# Patient Record
Sex: MALE | Race: WHITE | NOT HISPANIC OR LATINO | Employment: OTHER | URBAN - METROPOLITAN AREA
[De-identification: names, ages, dates, MRNs, and addresses within clinical notes are randomized per-mention and may not be internally consistent; named-entity substitution may affect disease eponyms.]

---

## 2019-08-12 ENCOUNTER — HOSPITAL ENCOUNTER (OUTPATIENT)
Dept: URGENT CARE | Facility: CLINIC | Age: 39
Discharge: HOME | End: 2019-08-12

## 2021-08-19 ENCOUNTER — TRANSCRIBE ORDERS (OUTPATIENT)
Dept: ADMINISTRATIVE | Facility: HOSPITAL | Age: 41
End: 2021-08-19

## 2021-08-19 ENCOUNTER — HOSPITAL ENCOUNTER (OUTPATIENT)
Dept: CT IMAGING | Facility: HOSPITAL | Age: 41
Discharge: HOME | End: 2021-08-19
Admitting: PHYSICIAN ASSISTANT

## 2021-08-19 DIAGNOSIS — R10.9 ABDOMINAL PAIN, UNSPECIFIED ABDOMINAL LOCATION: Primary | ICD-10-CM

## 2021-08-19 DIAGNOSIS — R10.9 ABDOMINAL PAIN, UNSPECIFIED ABDOMINAL LOCATION: ICD-10-CM

## 2021-08-19 PROCEDURE — 0 IOPAMIDOL PER 1 ML: Performed by: PHYSICIAN ASSISTANT

## 2021-08-19 PROCEDURE — 74177 CT ABD & PELVIS W/CONTRAST: CPT

## 2021-08-19 RX ADMIN — IOPAMIDOL 100 ML: 755 INJECTION, SOLUTION INTRAVENOUS at 13:35

## 2021-09-03 ENCOUNTER — TRANSCRIBE ORDERS (OUTPATIENT)
Dept: ADMINISTRATIVE | Facility: HOSPITAL | Age: 41
End: 2021-09-03

## 2021-09-03 DIAGNOSIS — R19.02 ABDOMINAL MASS, LUQ (LEFT UPPER QUADRANT): Primary | ICD-10-CM

## 2021-09-22 ENCOUNTER — APPOINTMENT (OUTPATIENT)
Dept: ULTRASOUND IMAGING | Facility: HOSPITAL | Age: 41
End: 2021-09-22

## 2022-02-01 ENCOUNTER — TRANSCRIBE ORDERS (OUTPATIENT)
Dept: ADMINISTRATIVE | Facility: HOSPITAL | Age: 42
End: 2022-02-01

## 2022-02-01 DIAGNOSIS — R19.00 ABDOMINAL MASS, UNSPECIFIED ABDOMINAL LOCATION: Primary | ICD-10-CM

## 2022-03-01 ENCOUNTER — APPOINTMENT (OUTPATIENT)
Dept: ULTRASOUND IMAGING | Facility: HOSPITAL | Age: 42
End: 2022-03-01

## 2023-10-31 ENCOUNTER — OFFICE VISIT (OUTPATIENT)
Dept: FAMILY MEDICINE CLINIC | Facility: CLINIC | Age: 43
End: 2023-10-31
Payer: COMMERCIAL

## 2023-10-31 VITALS
WEIGHT: 224.8 LBS | SYSTOLIC BLOOD PRESSURE: 130 MMHG | RESPIRATION RATE: 18 BRPM | DIASTOLIC BLOOD PRESSURE: 74 MMHG | TEMPERATURE: 97.8 F | BODY MASS INDEX: 32.18 KG/M2 | OXYGEN SATURATION: 98 % | HEIGHT: 70 IN | HEART RATE: 74 BPM

## 2023-10-31 DIAGNOSIS — Z13.220 SCREENING FOR LIPID DISORDERS: ICD-10-CM

## 2023-10-31 DIAGNOSIS — Z00.00 ANNUAL PHYSICAL EXAM: Primary | ICD-10-CM

## 2023-10-31 DIAGNOSIS — K21.9 GASTROESOPHAGEAL REFLUX DISEASE WITHOUT ESOPHAGITIS: ICD-10-CM

## 2023-10-31 DIAGNOSIS — E66.09 CLASS 1 OBESITY DUE TO EXCESS CALORIES WITH SERIOUS COMORBIDITY AND BODY MASS INDEX (BMI) OF 32.0 TO 32.9 IN ADULT: ICD-10-CM

## 2023-10-31 DIAGNOSIS — Z12.5 SCREENING FOR PROSTATE CANCER: ICD-10-CM

## 2023-10-31 DIAGNOSIS — Z11.59 NEED FOR HEPATITIS C SCREENING TEST: ICD-10-CM

## 2023-10-31 PROBLEM — E66.811 CLASS 1 OBESITY DUE TO EXCESS CALORIES WITH SERIOUS COMORBIDITY AND BODY MASS INDEX (BMI) OF 32.0 TO 32.9 IN ADULT: Chronic | Status: ACTIVE | Noted: 2023-10-31

## 2023-10-31 PROBLEM — F17.200 NICOTINE DEPENDENCE: Status: ACTIVE | Noted: 2023-10-31

## 2023-10-31 PROBLEM — E66.811 CLASS 1 OBESITY DUE TO EXCESS CALORIES WITH SERIOUS COMORBIDITY AND BODY MASS INDEX (BMI) OF 32.0 TO 32.9 IN ADULT: Status: ACTIVE | Noted: 2023-10-31

## 2023-10-31 RX ORDER — OMEPRAZOLE 20 MG/1
20 CAPSULE, DELAYED RELEASE ORAL
COMMUNITY

## 2023-10-31 NOTE — ASSESSMENT & PLAN NOTE
The patient was encouraged to always wear their seatbelt and never text and drive.  They were encouraged to get 7 to 8 hours sleep at night.  They were encouraged to exercise on a regular basis.  Screening labs were reviewed at today's visit and manage according to findings.

## 2023-10-31 NOTE — PROGRESS NOTES
"Chief Complaint  Establish Care and Annual Exam    Subjective        Jaya Jay presents to [facility]  History of Present Illness  Jaya presents to the clinic as a new patient to establish care. He has no significant past medical or surgical history. He is a current smoker 1.5 ppd for about 25 years, and drinks alcohol occasionally. He is allergic to bee venom and and takes no medication regularly, he just takes omeprazole 20 mg prn. 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 He lives at home with his wife, they have two cats, pepper and nugget.       Objective   Vital Signs:  /74 (BP Location: Left arm, Patient Position: Sitting, Cuff Size: Adult)   Pulse 74   Temp 97.8 °F (36.6 °C) (Tympanic)   Resp 18   Ht 177.8 cm (70\")   Wt 102 kg (224 lb 12.8 oz)   SpO2 98%   BMI 32.26 kg/m²   Estimated body mass index is 32.26 kg/m² as calculated from the following:    Height as of this encounter: 177.8 cm (70\").    Weight as of this encounter: 102 kg (224 lb 12.8 oz).             Physical Exam  Vitals reviewed.   Constitutional:       General: He is awake.      Appearance: Normal appearance. He is obese.   HENT:      Head: Normocephalic.      Nose: Nose normal.   Cardiovascular:      Rate and Rhythm: Normal rate and regular rhythm.   Pulmonary:      Effort: Pulmonary effort is normal.      Breath sounds: Normal breath sounds.   Abdominal:      General: Bowel sounds are normal.      Palpations: Abdomen is soft.   Musculoskeletal:         General: Normal range of motion.      Cervical back: Normal range of motion.   Skin:     General: Skin is warm and dry.   Neurological:      General: No focal deficit present.      Mental Status: He is alert and oriented to person, place, and time.   Psychiatric:         Attention and Perception: Attention normal.         Mood and Affect: Mood normal.         Speech: Speech normal.         " Behavior: Behavior normal. Behavior is cooperative.        Result Review :                         Assessment and Plan   Diagnoses and all orders for this visit:    1. Annual physical exam (Primary)  Assessment & Plan:  The patient was encouraged to always wear their seatbelt and never text and drive.  They were encouraged to get 7 to 8 hours sleep at night.  They were encouraged to exercise on a regular basis.  Screening labs were reviewed at today's visit and manage according to findings.        Orders:  -     TSH; Future  -     Comprehensive Metabolic Panel; Future  -     CBC & Differential; Future    2. Screening for lipid disorders  -     Lipid Panel; Future    3. Screening for prostate cancer  -     PSA Screen; Future    4. Need for hepatitis C screening test  -     Hepatitis C Antibody; Future    5. Class 1 obesity due to excess calories with serious comorbidity and body mass index (BMI) of 32.0 to 32.9 in adult  Assessment & Plan:  Patient's (Body mass index is 32.26 kg/m².) indicates that they are obese (BMI >30) with health conditions that include none . Weight is newly diagnosed. BMI  is above average; BMI management plan is completed. We discussed low calorie, low carb based diet program, portion control, and increasing exercise.       6. Gastroesophageal reflux disease without esophagitis  Assessment & Plan:  The patient will continue taking omeprazole 20 mg as needed for reflux symptoms.               Follow Up   Return in about 1 year (around 10/31/2024).  Patient was given instructions and counseling regarding his condition or for health maintenance advice. Please see specific information pulled into the AVS if appropriate.

## 2023-10-31 NOTE — ASSESSMENT & PLAN NOTE
Patient's (Body mass index is 32.26 kg/m².) indicates that they are obese (BMI >30) with health conditions that include none . Weight is newly diagnosed. BMI  is above average; BMI management plan is completed. We discussed low calorie, low carb based diet program, portion control, and increasing exercise.

## 2024-07-02 ENCOUNTER — HOSPITAL ENCOUNTER (OUTPATIENT)
Dept: CT IMAGING | Facility: HOSPITAL | Age: 44
Discharge: HOME | End: 2024-07-02
Payer: COMMERCIAL

## 2024-07-02 ENCOUNTER — OFFICE VISIT (OUTPATIENT)
Dept: FAMILY MEDICINE CLINIC | Facility: CLINIC | Age: 44
End: 2024-07-02
Payer: COMMERCIAL

## 2024-07-02 VITALS
HEIGHT: 70 IN | WEIGHT: 209 LBS | BODY MASS INDEX: 29.92 KG/M2 | SYSTOLIC BLOOD PRESSURE: 132 MMHG | TEMPERATURE: 98.7 F | HEART RATE: 66 BPM | DIASTOLIC BLOOD PRESSURE: 77 MMHG | OXYGEN SATURATION: 99 %

## 2024-07-02 DIAGNOSIS — E66.09 CLASS 1 OBESITY DUE TO EXCESS CALORIES WITH SERIOUS COMORBIDITY AND BODY MASS INDEX (BMI) OF 32.0 TO 32.9 IN ADULT: Chronic | ICD-10-CM

## 2024-07-02 DIAGNOSIS — R10.12 ABDOMINAL PAIN, LUQ: Primary | ICD-10-CM

## 2024-07-02 DIAGNOSIS — R10.12 ABDOMINAL PAIN, LUQ: ICD-10-CM

## 2024-07-02 PROCEDURE — 74177 CT ABD & PELVIS W/CONTRAST: CPT

## 2024-07-02 PROCEDURE — 25510000001 IOPAMIDOL PER 1 ML

## 2024-07-02 PROCEDURE — 99213 OFFICE O/P EST LOW 20 MIN: CPT

## 2024-07-02 RX ADMIN — IOPAMIDOL 100 ML: 755 INJECTION, SOLUTION INTRAVENOUS at 15:42

## 2024-07-02 NOTE — PROGRESS NOTES
"Chief Complaint  Flank Pain (Since Sunday night, left side nothing makes it better, constant sharp pain and breathing and riding on the  made it worse )    Subjective        Jaya Jay presents to [facility]  History of Present Illness  Jaya is here to be seen for flank pain since Sunday night on the left side. It is a constant sharp pain and riding the  and breathing make it worse. It started out in the left upper quadrant and has begun radiating to the pain.       Objective   Vital Signs:  /77 (BP Location: Right arm, Patient Position: Sitting, Cuff Size: Adult)   Pulse 66   Temp 98.7 °F (37.1 °C)   Ht 177.8 cm (70\")   Wt 94.8 kg (209 lb)   SpO2 99%   BMI 29.99 kg/m²   Estimated body mass index is 29.99 kg/m² as calculated from the following:    Height as of this encounter: 177.8 cm (70\").    Weight as of this encounter: 94.8 kg (209 lb).               Physical Exam  Constitutional:       Appearance: Normal appearance.   HENT:      Nose: Nose normal.      Mouth/Throat:      Mouth: Mucous membranes are moist.   Cardiovascular:      Rate and Rhythm: Normal rate and regular rhythm.      Pulses: Normal pulses.      Heart sounds: Normal heart sounds.   Pulmonary:      Effort: Pulmonary effort is normal.      Breath sounds: Normal breath sounds.   Abdominal:      Tenderness: There is abdominal tenderness (LUQ).   Skin:     General: Skin is warm and dry.   Neurological:      General: No focal deficit present.      Mental Status: He is alert and oriented to person, place, and time.   Psychiatric:         Mood and Affect: Mood normal.         Behavior: Behavior normal.        Result Review :                     Assessment and Plan     Diagnoses and all orders for this visit:    1. Abdominal pain, LUQ (Primary)  -     CT Abdomen Pelvis Without Contrast; Future  -     Cancel: CT Abdomen Pelvis With Contrast; Future  -     CT Abdomen Pelvis With Contrast; " Future    2. Class 1 obesity due to excess calories with serious comorbidity and body mass index (BMI) of 32.0 to 32.9 in adult  Comments:  BMI now down to 29.99.             Follow Up     Return if symptoms worsen or fail to improve.  Patient was given instructions and counseling regarding his condition or for health maintenance advice. Please see specific information pulled into the AVS if appropriate.

## 2024-07-02 NOTE — PROGRESS NOTES
Nothing acute seen. There is some cholelithiasis but not cholecystitis.  Miles Lemos), Internal Medicine  [address] [place] [address]  Phone: [phone]  Fax: [phone]    Blaine Persaud), Neurology  [address] [place] [address]  Phone: [phone]  Fax: [phone]

## 2024-12-03 ENCOUNTER — OFFICE VISIT (OUTPATIENT)
Dept: GASTROENTEROLOGY | Facility: CLINIC | Age: 44
End: 2024-12-03
Payer: COMMERCIAL

## 2024-12-03 VITALS
HEIGHT: 70 IN | DIASTOLIC BLOOD PRESSURE: 80 MMHG | HEART RATE: 72 BPM | BODY MASS INDEX: 30.92 KG/M2 | SYSTOLIC BLOOD PRESSURE: 145 MMHG | WEIGHT: 216 LBS

## 2024-12-03 DIAGNOSIS — R10.30 LOWER ABDOMINAL PAIN: ICD-10-CM

## 2024-12-03 DIAGNOSIS — R10.13 EPIGASTRIC PAIN: Primary | ICD-10-CM

## 2024-12-03 DIAGNOSIS — R13.19 ESOPHAGEAL DYSPHAGIA: ICD-10-CM

## 2024-12-03 PROCEDURE — 99214 OFFICE O/P EST MOD 30 MIN: CPT | Performed by: NURSE PRACTITIONER

## 2024-12-03 RX ORDER — SODIUM, POTASSIUM,MAG SULFATES 17.5-3.13G
2 SOLUTION, RECONSTITUTED, ORAL ORAL TAKE AS DIRECTED
Qty: 354 ML | Refills: 0 | Status: SHIPPED | OUTPATIENT
Start: 2024-12-03

## 2024-12-03 RX ORDER — PANTOPRAZOLE SODIUM 40 MG/1
40 TABLET, DELAYED RELEASE ORAL DAILY
Qty: 90 TABLET | Refills: 1 | Status: SHIPPED | OUTPATIENT
Start: 2024-12-03

## 2024-12-03 NOTE — PROGRESS NOTES
Chief Complaint     Abdominal Pain, Diarrhea, Weight Loss, and Chest Pain    History of Present Illness     Jaya Jay is a 44 y.o. male who presents to [facility] on referral from Karol Escobar DO for a gastroenterology evaluation of abdominal pain, diarrhea and weight loss.      Reports experiencing lower abdominal pain that began 2 to 3 months ago.  He states that pain was in the lower portion of the abdomen and waxes and wanes.  This has improved recently.  States that bowel pattern is regular.  He has 1-2 bowel movements daily.  Denies rectal bleeding.    Admits epigastric pain that began on Saturday evening.  When pain was present that radiated to both sides of the upper abdomen.  He did note that pain was worse following a meal.  He experienced 2-3 episodes of vomiting on Sunday and a few episodes of diarrhea.  Yesterday he avoided eating throughout the day.  He ate chicken noodle soup last evening and did experience some epigastric pain following this.    Reports that he's had heartburn for many years that he describes as sporadic.  He has lost some weight this year and feels that heartburn has been improved since losing weight.  Admits periodic dysphagia however he has noticed this to be worsening in the past few weeks.       History      History reviewed. No pertinent past medical history.    Past Surgical History:   Procedure Laterality Date    FRACTURE SURGERY      arm       History reviewed. No pertinent family history.     Current Medications        Current Outpatient Medications:     pantoprazole (Protonix) 40 MG EC tablet, Take 1 tablet by mouth Daily., Disp: 90 tablet, Rfl: 1    sodium-potassium-magnesium sulfates (Suprep Bowel Prep Kit) 17.5-3.13-1.6 GM/177ML solution oral solution, Take 2 bottles by mouth Take As Directed., Disp: 354 mL, Rfl: 0     Allergies     Allergies   Allergen Reactions    Bee Venom Swelling       Social History       Social History  "    Social History Narrative    Not on file       Immunizations     Immunization:  Immunization History   Administered Date(s) Administered    Td (TDVAX) 12/04/1996          Objective     Objective     Vital Signs:   /80 (BP Location: Left arm, Patient Position: Sitting, Cuff Size: Adult)   Pulse 72   Ht 177.8 cm (70\")   Wt 98 kg (216 lb)   BMI 30.99 kg/m²       Physical Exam    Results      Result Review :   The following data was reviewed by: SIGIFREDO Ortiz on 12/03/2024:      7/2/2024 CT abdomen pelvis with contrast-multiple calcified stones within the gallbladder.  No inflammatory change around the gallbladder.  No evidence of biliary tract obstruction.  Fat-containing umbilical hernia.  GI tract is otherwise normal.           Assessment and Plan        Assessment and Plan    Diagnoses and all orders for this visit:    1. Epigastric pain (Primary)  -     Case Request; Standing  -     Case Request    2. Lower abdominal pain  -     Case Request; Standing  -     Case Request    3. Esophageal dysphagia  -     Case Request; Standing  -     Case Request  -     pantoprazole (Protonix) 40 MG EC tablet; Take 1 tablet by mouth Daily.  Dispense: 90 tablet; Refill: 1    Other orders  -     Follow Anesthesia Guidelines / Protocol; Future  -     Verify NPO; Standing  -     Verify Bowel Prep Was Successful; Standing  -     Give Tap Water Enema If Bowel Prep Insufficient; Standing  -     sodium-potassium-magnesium sulfates (Suprep Bowel Prep Kit) 17.5-3.13-1.6 GM/177ML solution oral solution; Take 2 bottles by mouth Take As Directed.  Dispense: 354 mL; Refill: 0      Recommend to follow a bland diet and begin protonix.  Schedule for EGD/Colonoscopy for further evaluation of symptoms.  If unrevealing, consider further w/u of gallbladder given stones noted on previous CT.      ESOPHAGOGASTRODUODENOSCOPY (N/A), COLONOSCOPY (N/A)The risk of the endoscopy were discussed in detail. Possible risks/complications, " benefits, and alternatives to surgical or invasive procedure have been explained to patient and/or legal guardian; risks include bleeding, infection, and perforation. Patient has been evaluated and can tolerate anesthesia and/or sedation.         Follow Up        Follow Up   Return in about 6 months (around 6/3/2025) for abdominal pain.  Patient was given instructions and counseling regarding his condition or for health maintenance advice. Please see specific information pulled into the AVS if appropriate.

## 2024-12-12 ENCOUNTER — PATIENT ROUNDING (BHMG ONLY) (OUTPATIENT)
Dept: GASTROENTEROLOGY | Facility: CLINIC | Age: 44
End: 2024-12-12
Payer: COMMERCIAL

## 2025-01-20 ENCOUNTER — ANESTHESIA EVENT (OUTPATIENT)
Dept: GASTROENTEROLOGY | Facility: HOSPITAL | Age: 45
End: 2025-01-20
Payer: COMMERCIAL

## 2025-01-20 NOTE — ANESTHESIA PREPROCEDURE EVALUATION
Anesthesia Evaluation     Patient summary reviewed and Nursing notes reviewed   NPO Solid Status: > 8 hours  NPO Liquid Status: > 8 hours           Airway   Mallampati: I  TM distance: >3 FB  Neck ROM: full  No difficulty expected  Dental - normal exam     Pulmonary - normal exam    breath sounds clear to auscultation  (+) a smoker (current) Current, cigarettes,  Cardiovascular - normal exam  Exercise tolerance: good (4-7 METS)    Rhythm: regular  Rate: normal        Neuro/Psych  GI/Hepatic/Renal/Endo    (+) obesity, GERD well controlled    Musculoskeletal     Abdominal    Substance History      OB/GYN          Other        ROS/Med Hx Other: Epigastric pain, lower abd pain    No EKG avail                Anesthesia Plan    ASA 2     general   total IV anesthesia  (Patient understands anesthesia not responsible for dental damage. Risks explained including allergic reactions, BP, HR, O2 changes, aspiration, advanced airway placement. Pt verbalized understanding.)  intravenous induction     Anesthetic plan, risks, benefits, and alternatives have been provided, discussed and informed consent has been obtained with: patient and spouse/significant other.  Pre-procedure education provided  Plan discussed with CRNA.      CODE STATUS:

## 2025-01-21 ENCOUNTER — ANESTHESIA (OUTPATIENT)
Dept: GASTROENTEROLOGY | Facility: HOSPITAL | Age: 45
End: 2025-01-21
Payer: COMMERCIAL

## 2025-01-21 ENCOUNTER — HOSPITAL ENCOUNTER (OUTPATIENT)
Facility: HOSPITAL | Age: 45
Setting detail: HOSPITAL OUTPATIENT SURGERY
Discharge: HOME | End: 2025-01-21
Attending: INTERNAL MEDICINE | Admitting: INTERNAL MEDICINE
Payer: COMMERCIAL

## 2025-01-21 VITALS
WEIGHT: 213.41 LBS | RESPIRATION RATE: 20 BRPM | DIASTOLIC BLOOD PRESSURE: 93 MMHG | BODY MASS INDEX: 30.62 KG/M2 | HEART RATE: 70 BPM | TEMPERATURE: 97.8 F | OXYGEN SATURATION: 98 % | SYSTOLIC BLOOD PRESSURE: 124 MMHG

## 2025-01-21 DIAGNOSIS — R10.30 LOWER ABDOMINAL PAIN: ICD-10-CM

## 2025-01-21 DIAGNOSIS — R10.13 EPIGASTRIC PAIN: ICD-10-CM

## 2025-01-21 PROCEDURE — 25010000002 LIDOCAINE PF 2% 2 % SOLUTION: Performed by: NURSE ANESTHETIST, CERTIFIED REGISTERED

## 2025-01-21 PROCEDURE — 43239 EGD BIOPSY SINGLE/MULTIPLE: CPT | Performed by: INTERNAL MEDICINE

## 2025-01-21 PROCEDURE — 45385 COLONOSCOPY W/LESION REMOVAL: CPT | Performed by: INTERNAL MEDICINE

## 2025-01-21 PROCEDURE — 88342 IMHCHEM/IMCYTCHM 1ST ANTB: CPT | Performed by: INTERNAL MEDICINE

## 2025-01-21 PROCEDURE — 25810000003 LACTATED RINGERS PER 1000 ML: Performed by: NURSE ANESTHETIST, CERTIFIED REGISTERED

## 2025-01-21 PROCEDURE — 25010000002 PROPOFOL 10 MG/ML EMULSION: Performed by: NURSE ANESTHETIST, CERTIFIED REGISTERED

## 2025-01-21 PROCEDURE — 88305 TISSUE EXAM BY PATHOLOGIST: CPT | Performed by: INTERNAL MEDICINE

## 2025-01-21 PROCEDURE — 45380 COLONOSCOPY AND BIOPSY: CPT | Performed by: INTERNAL MEDICINE

## 2025-01-21 RX ORDER — SODIUM CHLORIDE, SODIUM LACTATE, POTASSIUM CHLORIDE, CALCIUM CHLORIDE 600; 310; 30; 20 MG/100ML; MG/100ML; MG/100ML; MG/100ML
30 INJECTION, SOLUTION INTRAVENOUS CONTINUOUS
Status: DISCONTINUED | OUTPATIENT
Start: 2025-01-21 | End: 2025-01-21 | Stop reason: HOSPADM

## 2025-01-21 RX ORDER — PROPOFOL 10 MG/ML
VIAL (ML) INTRAVENOUS AS NEEDED
Status: DISCONTINUED | OUTPATIENT
Start: 2025-01-21 | End: 2025-01-21 | Stop reason: SURG

## 2025-01-21 RX ORDER — LIDOCAINE HYDROCHLORIDE 20 MG/ML
INJECTION, SOLUTION EPIDURAL; INFILTRATION; INTRACAUDAL; PERINEURAL AS NEEDED
Status: DISCONTINUED | OUTPATIENT
Start: 2025-01-21 | End: 2025-01-21 | Stop reason: SURG

## 2025-01-21 RX ADMIN — PROPOFOL 50 MG: 10 INJECTION, EMULSION INTRAVENOUS at 10:14

## 2025-01-21 RX ADMIN — LIDOCAINE HYDROCHLORIDE 40 MG: 20 INJECTION, SOLUTION INTRAVENOUS at 10:13

## 2025-01-21 RX ADMIN — PROPOFOL 250 MCG/KG/MIN: 10 INJECTION, EMULSION INTRAVENOUS at 10:13

## 2025-01-21 RX ADMIN — PROPOFOL 100 MG: 10 INJECTION, EMULSION INTRAVENOUS at 10:13

## 2025-01-21 RX ADMIN — SODIUM CHLORIDE, POTASSIUM CHLORIDE, SODIUM LACTATE AND CALCIUM CHLORIDE: 600; 310; 30; 20 INJECTION, SOLUTION INTRAVENOUS at 10:13

## 2025-01-21 NOTE — H&P
Pre Procedure History & Physical    Chief Complaint:   Epigastric pain, lower abd pain, dysphagia, diarrhea    Subjective     HPI:   43 yo M here for eval of epigastric pain, lower abd pain, dysphagia, diarrhea.    Past Medical History:   History reviewed. No pertinent past medical history.    Past Surgical History:  Past Surgical History:   Procedure Laterality Date    FRACTURE SURGERY      arm    FRACTURE SURGERY      back    WISDOM TOOTH EXTRACTION         Family History:  Family History   Problem Relation Age of Onset    Cancer Mother        Social History:   reports that he has been smoking cigarettes. He started smoking about 22 years ago. He has a 33.1 pack-year smoking history. He has been exposed to tobacco smoke. He has never used smokeless tobacco. He reports current alcohol use of about 2.0 standard drinks of alcohol per week. He reports that he does not use drugs.    Medications:   Medications Prior to Admission   Medication Sig Dispense Refill Last Dose/Taking    pantoprazole (Protonix) 40 MG EC tablet Take 1 tablet by mouth Daily. 90 tablet 1 Past Month       Allergies:  Bee venom    ROS:    Pertinent items are noted in HPI     Objective     Blood pressure 120/76, pulse 67, temperature 98.3 °F (36.8 °C), temperature source Temporal, weight 96.8 kg (213 lb 6.5 oz), SpO2 97%.    Physical Exam   Constitutional: Pt is oriented to person, place, and time and well-developed, well-nourished, and in no distress.   Mouth/Throat: Oropharynx is clear and moist.   Neck: Normal range of motion.   Cardiovascular: Normal rate, regular rhythm and normal heart sounds.    Pulmonary/Chest: Effort normal and breath sounds normal.   Abdominal: Soft. Nontender  Skin: Skin is warm and dry.   Psychiatric: Mood, memory, affect and judgment normal.     Assessment & Plan     Diagnosis:  Epigastric pain, lower abd pain, dysphagia, diarrhea    Anticipated Surgical Procedure:  EGD/colonoscopy    The risks, benefits, and  alternatives of this procedure have been discussed with the patient or the responsible party- the patient understands and agrees to proceed.

## 2025-01-21 NOTE — ANESTHESIA POSTPROCEDURE EVALUATION
Patient: Jaya Jay    Procedure Summary       Date: 01/21/25 Room / Location: [facility] ENDOSCOPY 3 / [facility] ENDOSCOPY    Anesthesia Start: 1012 Anesthesia Stop: 1054    Procedures:       ESOPHAGOGASTRODUODENOSCOPY WITH BIOPSIES      COLONOSCOPY WITH BIOPSIES, POLYPECTOMIES Diagnosis:       Epigastric pain      Lower abdominal pain      (Epigastric pain [R10.13])      (Lower abdominal pain [R10.30])    Surgeons: Erika Bach MD Provider: Tangela Butt CRNA    Anesthesia Type: general ASA Status: 2            Anesthesia Type: general    Vitals  Vitals Value Taken Time   /93 01/21/25 1111   Temp 36.6 °C (97.8 °F) 01/21/25 1050   Pulse 72 01/21/25 1112   Resp 20 01/21/25 1110   SpO2 98 % 01/21/25 1112   Vitals shown include unfiled device data.        Post Anesthesia Care and Evaluation    Post-procedure mental status: acceptable.  Pain management: satisfactory to patient    Airway patency: patent  Anesthetic complications: No anesthetic complications    Cardiovascular status: acceptable  Respiratory status: acceptable, spontaneous ventilation and room air    Comments: Per chart review

## 2025-01-23 LAB
CYTO UR: NORMAL
LAB AP CASE REPORT: NORMAL
LAB AP CLINICAL INFORMATION: NORMAL
LAB AP SPECIAL STAINS: NORMAL
PATH REPORT.FINAL DX SPEC: NORMAL
PATH REPORT.GROSS SPEC: NORMAL

## 2025-01-29 ENCOUNTER — TELEPHONE (OUTPATIENT)
Dept: GASTROENTEROLOGY | Facility: CLINIC | Age: 45
End: 2025-01-29
Payer: COMMERCIAL

## 2025-01-29 NOTE — TELEPHONE ENCOUNTER
Attempted to contact patient, left voicemail message to return call. Provided direct contact information.    Follow up with SIGIFREDO Zhang on 06.25.25 at 0900.    Care Gap updated:  5 year colon recall placed

## 2025-01-29 NOTE — TELEPHONE ENCOUNTER
----- Message from Aleida Segura sent at 1/24/2025  9:39 AM EST -----  Biopsies are consistent with reflux esophagitis and gastritis.  Is he taking NSAIDS?  If so, recommend to stop these.  Continue with protonix daily.  Colon polyps benign.  Recommend repeat colonoscopy in 5 years.  Please place in recall.  Acute and chronic inflammation noted in the terminal ileum.  Is patient continuing to experience abdominal pain and diarrhea?  If so recommend budesonide.  This can also be caused from NSAID use if he is taking those.

## 2025-01-31 NOTE — TELEPHONE ENCOUNTER
"Spoke to patient and informed of SIGIFREDO Zhang result note and recommendations. Verified patient understanding.    Patient admits to NSAID use in the past and continued use as well.  Educated to avoid them if possible.  Advised patient to reach out to managing provider to discuss alternate therapies if possible.  Patient verbalized understanding.     Patient states he only takes pantoprazole as he needs it.  States when he has a flare up of acid reflux he will take for a few weeks, then stop.  Educated on why and how best to take PPI medications.  Patient states he will \"try to do better\".    Patient denies any continued abdominal pain/diarrhea.      Confirmed follow up with SIGIFREDO Zhang on 06.25.25.    Advised patient to reach out to our office with any GI needs.  "

## 2025-06-05 ENCOUNTER — OFFICE VISIT (OUTPATIENT)
Dept: FAMILY MEDICINE CLINIC | Facility: CLINIC | Age: 45
End: 2025-06-05
Payer: COMMERCIAL

## 2025-06-05 VITALS
WEIGHT: 211 LBS | OXYGEN SATURATION: 99 % | HEIGHT: 70 IN | SYSTOLIC BLOOD PRESSURE: 138 MMHG | TEMPERATURE: 100.9 F | HEART RATE: 79 BPM | DIASTOLIC BLOOD PRESSURE: 85 MMHG | BODY MASS INDEX: 30.21 KG/M2

## 2025-06-05 DIAGNOSIS — R52 BODY ACHES: ICD-10-CM

## 2025-06-05 DIAGNOSIS — R09.81 NASAL CONGESTION: ICD-10-CM

## 2025-06-05 DIAGNOSIS — J10.1 INFLUENZA B: Primary | ICD-10-CM

## 2025-06-05 DIAGNOSIS — J02.9 SORE THROAT: ICD-10-CM

## 2025-06-05 LAB
EXPIRATION DATE: ABNORMAL
EXPIRATION DATE: NORMAL
FLUAV AG UPPER RESP QL IA.RAPID: NOT DETECTED
FLUBV AG UPPER RESP QL IA.RAPID: DETECTED
INTERNAL CONTROL: ABNORMAL
INTERNAL CONTROL: NORMAL
Lab: 389
Lab: ABNORMAL
S PYO AG THROAT QL: NEGATIVE
SARS-COV-2 AG UPPER RESP QL IA.RAPID: NOT DETECTED

## 2025-06-05 RX ORDER — METHYLPREDNISOLONE 4 MG/1
TABLET ORAL
Qty: 21 EACH | Refills: 0 | Status: SHIPPED | OUTPATIENT
Start: 2025-06-05

## 2025-06-05 NOTE — PROGRESS NOTES
Chief Complaint     Nasal Congestion (Since Sunday ), Fever, Sore Throat, and Joint Pain    Patient or patient representative verbalized consent for the use of Ambient Listening during the visit with  SIGIFREDO Jorge for chart documentation. 6/5/2025  14:07 EDT    History of Present Illness     Jaya Jay is a 44 y.o. male who presents to [facility] .    History of Present Illness  The patient presents for evaluation of influenza B.    He has been experiencing symptoms since Monday, initially attributing them to allergies. He reports no lower respiratory issues but confirms the presence of upper respiratory symptoms. He expresses a preference for oral steroid medication over an injection. He inquires about managing his fever and is advised to alternate Tylenol and ibuprofen every three hours.         History      History reviewed. No pertinent past medical history.    Past Surgical History:   Procedure Laterality Date    COLONOSCOPY N/A 1/21/2025    Procedure: COLONOSCOPY WITH BIOPSIES, POLYPECTOMIES;  Surgeon: Erika Bach MD;  Location: [facility] ENDOSCOPY;  Service: Gastroenterology;  Laterality: N/A;  COLON POLYPS, HEMORRHOIDS    ENDOSCOPY N/A 1/21/2025    Procedure: ESOPHAGOGASTRODUODENOSCOPY WITH BIOPSIES;  Surgeon: Erika Bach MD;  Location: [facility] ENDOSCOPY;  Service: Gastroenterology;  Laterality: N/A;  ESOPHAGITIS    FRACTURE SURGERY      arm    FRACTURE SURGERY      back    WISDOM TOOTH EXTRACTION         Family History   Problem Relation Age of Onset    Cancer Mother         Current Medications      No current outpatient medications on file.     Allergies     Allergies   Allergen Reactions    Bee Venom Swelling       Social History       Social History     Social History Narrative    Not on file       Immunizations     Immunization:  Immunization History   Administered Date(s) Administered    Td (TDVAX) 12/04/1996          Objective  "    Objective     Vital Signs:   /85 (BP Location: Left arm, Patient Position: Sitting, Cuff Size: Adult)   Pulse 79   Temp (!) 100.9 °F (38.3 °C) (Temporal)   Ht 177.8 cm (70\")   Wt 95.7 kg (211 lb)   SpO2 99%   BMI 30.28 kg/m²       Physical Exam  Constitutional:       Appearance: Normal appearance. He is ill-appearing.   HENT:      Nose: Nose normal.      Mouth/Throat:      Mouth: Mucous membranes are moist.   Cardiovascular:      Rate and Rhythm: Normal rate and regular rhythm.      Pulses: Normal pulses.      Heart sounds: Normal heart sounds.   Pulmonary:      Effort: Pulmonary effort is normal.      Breath sounds: Normal breath sounds.   Skin:     General: Skin is warm and dry.   Neurological:      General: No focal deficit present.      Mental Status: He is alert and oriented to person, place, and time.   Psychiatric:         Mood and Affect: Mood normal.         Behavior: Behavior normal.         Physical Exam  Mouth/Throat: Mucous membranes moist, no erythema, no exudate  Respiratory: Clear to auscultation, no wheezing, rales or rhonchi      Results    The following data was reviewed by: SIGIFREDO Jorge on 06/05/2025:          Results  Labs   - Influenza B test: Positive       Assessment and Plan        Assessment and Plan    Diagnoses and all orders for this visit:    1. Influenza B (Primary)    2. Sore throat  -     POCT rapid strep A  -     POCT SARS-CoV-2 Antigen HO + Flu    3. Nasal congestion  -     POCT rapid strep A  -     POCT SARS-CoV-2 Antigen HO + Flu    4. Body aches  -     POCT rapid strep A  -     POCT SARS-CoV-2 Antigen HO + Flu        Assessment & Plan  1. Influenza B.  - Symptoms began on 06/02/2025, presenting primarily as upper respiratory issues.  - Pulmonary function is normal, indicating the infection is confined to the upper respiratory tract.  - Discussed the ineffectiveness of penicillin for viral infections and the benefit of steroids.  - Prescribed a 5-day " course of oral steroids and advised alternating Tylenol and ibuprofen every 3 hours for fever management.      I spent 30 minutes caring for Jaya on this date of service. This time includes time spent by me in the following activities:preparing for the visit, reviewing tests, performing a medically appropriate examination and/or evaluation , counseling and educating the patient/family/caregiver, ordering medications, tests, or procedures, and documenting information in the medical record  Follow Up        Follow Up   No follow-ups on file.  Patient was given instructions and counseling regarding his condition or for health maintenance advice. Please see specific information pulled into the AVS if appropriate.

## 2025-06-25 ENCOUNTER — OFFICE VISIT (OUTPATIENT)
Dept: GASTROENTEROLOGY | Facility: CLINIC | Age: 45
End: 2025-06-25
Payer: COMMERCIAL

## 2025-06-25 VITALS
BODY MASS INDEX: 30.6 KG/M2 | HEIGHT: 71 IN | SYSTOLIC BLOOD PRESSURE: 132 MMHG | HEART RATE: 73 BPM | WEIGHT: 218.6 LBS | DIASTOLIC BLOOD PRESSURE: 73 MMHG

## 2025-06-25 DIAGNOSIS — K29.50 OTHER CHRONIC GASTRITIS WITHOUT HEMORRHAGE: ICD-10-CM

## 2025-06-25 DIAGNOSIS — K20.90 ESOPHAGITIS: Primary | ICD-10-CM

## 2025-06-25 RX ORDER — PANTOPRAZOLE SODIUM 20 MG/1
20 TABLET, DELAYED RELEASE ORAL DAILY
Qty: 90 TABLET | Refills: 0 | Status: SHIPPED | OUTPATIENT
Start: 2025-06-25

## 2025-06-25 RX ORDER — PANTOPRAZOLE SODIUM 20 MG/1
20 TABLET, DELAYED RELEASE ORAL DAILY
Qty: 90 TABLET | Refills: 0 | Status: SHIPPED | OUTPATIENT
Start: 2025-06-25 | End: 2025-06-25 | Stop reason: SDUPTHER

## 2025-06-25 NOTE — PROGRESS NOTES
Chief Complaint     Abdominal Pain    Patient or patient representative verbalized consent for the use of Ambient Listening during the visit with  SIGIFREDO Ortiz for chart documentation. 6/25/2025  09:08 EDT      History of Present Illness     History of Present Illness  The patient presents for a follow-up of epigastric pain, lower abdominal pain, and esophageal dysphagia.    He has been experiencing persistent back, neck, and hip pain, which he manages with daily ibuprofen. He alternates between Tylenol and ibuprofen but finds the latter more effective. He has not sought medical attention for his back pain recently, although he was previously under the care of Dr. Alonzo for several years. He was prescribed hydrocodone but did not adhere to the recommended dosage as he found relief with a lower dose. Since Dr. Alonzo's skilled nursing, he has not consulted another physician for his back pain. He recently established care with Dr. Escobar but has not yet discussed his back pain with him.    He reports that his abdominal pain has resolved without the need for pantoprazole. He has a long-standing history of severe heartburn since his early teens, which often resulted in morning vomiting. Despite trying various treatments including Nexium, his symptoms persisted until they abruptly ceased. He expresses concern about this sudden change, even though he has not made any lifestyle modifications or experienced weight loss. He is currently asymptomatic with no heartburn or abdominal pain. He did not take pantoprazole after his scope.    His swallowing function has improved, and he finds that drinking a small amount of liquid before meals prevents food from getting stuck.         History      History reviewed. No pertinent past medical history.  Past Surgical History:   Procedure Laterality Date    COLONOSCOPY N/A 1/21/2025    Procedure: COLONOSCOPY WITH BIOPSIES, POLYPECTOMIES;  Surgeon: Erika Bach MD;   "Location: [facility] ENDOSCOPY;  Service: Gastroenterology;  Laterality: N/A;  COLON POLYPS, HEMORRHOIDS    ENDOSCOPY N/A 1/21/2025    Procedure: ESOPHAGOGASTRODUODENOSCOPY WITH BIOPSIES;  Surgeon: Erika Bach MD;  Location: [facility] ENDOSCOPY;  Service: Gastroenterology;  Laterality: N/A;  ESOPHAGITIS    FRACTURE SURGERY      arm    FRACTURE SURGERY      back    WISDOM TOOTH EXTRACTION       Family History   Problem Relation Age of Onset    Cancer Mother         Current Medications       Current Outpatient Medications:     pantoprazole (Protonix) 20 MG EC tablet, Take 1 tablet by mouth Daily., Disp: 90 tablet, Rfl: 0     Allergies     Allergies   Allergen Reactions    Bee Venom Swelling       Social History       Social History     Social History Narrative    Not on file         Objective       /73 (BP Location: Left arm, Patient Position: Sitting, Cuff Size: Adult)   Pulse 73   Ht 180.3 cm (70.98\")   Wt 99.2 kg (218 lb 9.6 oz)   BMI 30.50 kg/m²       Physical Exam    Results       Result Review :    The following data was reviewed by: SIGIFREDO Ortiz on 06/25/2025:            Results  Labs   - Biopsy for eosinophilic esophagitis: 01/21/2025, Negative   - Biopsy for reflux esophagitis: 01/21/2025, Changes consistent with reflux esophagitis, negative for intestinal metaplasia   - Gastric antrum biopsy: 01/21/2025, Chronic active gastritis, negative for H. 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 Middle third of the esophagus was normal. Mild esophagitis was found at the GE junction. The entire examined stomach was normal. First and second portion of the duodenum were normal.   - Colonoscopy: 01/2025, Patchy mild " inflammation characterized by erythema in the terminal ileum. 7 mm polyp in the transverse colon, sessile serrated lesion incompletely removed. Two polyps in the rectum (5 to 6 mm in size), hyperplastic polyps completely removed. Mild diverticulosis in the sigmoid colon. Internal hemorrhoids. The remainder of the colon appeared normal.           Assessment and Plan              Diagnoses and all orders for this visit:    1. Esophagitis (Primary)  -     pantoprazole (Protonix) 20 MG EC tablet; Take 1 tablet by mouth Daily.  Dispense: 90 tablet; Refill: 0    2. Other chronic gastritis without hemorrhage  -     pantoprazole (Protonix) 20 MG EC tablet; Take 1 tablet by mouth Daily.  Dispense: 90 tablet; Refill: 0    Other orders  -     Discontinue: pantoprazole (Protonix) 20 MG EC tablet; Take 1 tablet by mouth Daily.  Dispense: 90 tablet; Refill: 0        Assessment & Plan  1. Esophagitis and gastritis:  - Discuss alternative non-narcotic NSAIDs with primary care physician to manage pain without causing gastrointestinal irritation.  - Prescription for pantoprazole 20 mg issued for 90 days to heal and reduce inflammation caused by NSAID use.    2. Esophageal dysphagia:  - Drinking a little before eating helps prevent food from getting stuck.  - No abnormalities found during previous endoscopy to explain swallowing issue, suggesting it may be due to dryness.            Follow Up     Follow Up   Return if symptoms worsen or fail to improve.  Patient was given instructions and counseling regarding his condition or for health maintenance advice. Please see specific information pulled into the AVS if appropriate.

## (undated) DEVICE — Device

## (undated) DEVICE — LINER SURG CANSTR SXN S/RIGD 1500CC

## (undated) DEVICE — CONN JET HYDRA H20 AUXILIARY DISP

## (undated) DEVICE — SNAR POLYP CAPTIFLEX XS/OVL 11X2.4MM 240CM 1P/U

## (undated) DEVICE — BLCK/BITE BLOX WO/DENTL/RIM W/STRAP 54F

## (undated) DEVICE — SINGLE-USE BIOPSY FORCEPS: Brand: RADIAL JAW 4

## (undated) DEVICE — THE SINGLE USE ETRAP – POLYP TRAP IS USED FOR SUCTION RETRIEVAL OF ENDOSCOPICALLY REMOVED POLYPS.: Brand: ETRAP

## (undated) DEVICE — SOLIDIFIER LIQLOC PLS 1500CC BT

## (undated) DEVICE — SOL IRRG H2O PL/BG 1000ML STRL

## (undated) DEVICE — Device: Brand: DEFENDO AIR/WATER/SUCTION AND BIOPSY VALVE